# Patient Record
Sex: FEMALE | Race: WHITE
[De-identification: names, ages, dates, MRNs, and addresses within clinical notes are randomized per-mention and may not be internally consistent; named-entity substitution may affect disease eponyms.]

---

## 2018-04-20 ENCOUNTER — HOSPITAL ENCOUNTER (EMERGENCY)
Dept: HOSPITAL 59 - ER | Age: 33
Discharge: HOME | End: 2018-04-20
Payer: COMMERCIAL

## 2018-04-20 DIAGNOSIS — R11.2: ICD-10-CM

## 2018-04-20 DIAGNOSIS — R10.30: Primary | ICD-10-CM

## 2018-04-20 DIAGNOSIS — M54.5: ICD-10-CM

## 2018-04-20 LAB
ANION GAP SERPL CALC-SCNC: 15 MMOL/L (ref 7–16)
APPEARANCE UR: CLEAR
BASOPHILS NFR BLD: 0.8 % (ref 0–6)
BILIRUB UR-MCNC: NEGATIVE MG/DL
BUN SERPL-MCNC: 15 MG/DL (ref 6–20)
CO2 SERPL-SCNC: 23 MMOL/L (ref 22–29)
COLOR UR: YELLOW
CREAT SERPL-MCNC: 0.8 MG/DL (ref 0.5–0.9)
EOSINOPHIL NFR BLD: 1.2 % (ref 0–6)
EPI CELLS #/AREA URNS HPF: (no result) /[HPF]
ERYTHROCYTE [DISTWIDTH] IN BLOOD BY AUTOMATED COUNT: 13.1 % (ref 11.5–14.5)
EST GLOMERULAR FILTRATION RATE: > 60 ML/MIN
GLUCOSE SERPL-MCNC: 139 MG/DL (ref 74–109)
GLUCOSE UR STRIP-MCNC: NEGATIVE MG/DL
GRANULOCYTES NFR BLD: 69.4 % (ref 47–80)
HCG,QUALITATIVE URINE: NEGATIVE
HCT VFR BLD CALC: 42.5 % (ref 35–47)
HGB BLD-MCNC: 14.1 GM/DL (ref 11.6–16)
KETONES UR QL STRIP: NEGATIVE
LYMPHOCYTES NFR BLD AUTO: 22.9 % (ref 16–45)
MCH RBC QN AUTO: 29.7 PG (ref 27–33)
MCHC RBC AUTO-ENTMCNC: 33.2 G/DL (ref 32–36)
MCV RBC AUTO: 89.5 FL (ref 81–97)
MONOCYTES NFR BLD: 5.7 % (ref 0–9)
NITRITE UR QL STRIP: NEGATIVE
PLATELET # BLD: 219 K/UL (ref 130–400)
PMV BLD AUTO: 10.9 FL (ref 7.4–10.4)
PROT UR QL STRIP: (no result)
RBC # BLD AUTO: 4.75 M/UL (ref 3.8–5.4)
RBC # UR STRIP: NEGATIVE /UL
RBC #/AREA URNS HPF: (no result) /[HPF]
URINE LEUKOCYTE ESTERASE: NEGATIVE
UROBILINOGEN UR STRIP-ACNC: 0.2 E.U./DL (ref 0.2–1)
WBC # BLD AUTO: 6.6 K/UL (ref 4.2–12.2)
WBC #/AREA URNS HPF: (no result) /[HPF]

## 2018-04-20 PROCEDURE — 85025 COMPLETE CBC W/AUTO DIFF WBC: CPT

## 2018-04-20 PROCEDURE — 96375 TX/PRO/DX INJ NEW DRUG ADDON: CPT

## 2018-04-20 PROCEDURE — 99284 EMERGENCY DEPT VISIT MOD MDM: CPT

## 2018-04-20 PROCEDURE — 80048 BASIC METABOLIC PNL TOTAL CA: CPT

## 2018-04-20 PROCEDURE — 81025 URINE PREGNANCY TEST: CPT

## 2018-04-20 PROCEDURE — 81001 URINALYSIS AUTO W/SCOPE: CPT

## 2018-04-20 PROCEDURE — 96374 THER/PROPH/DIAG INJ IV PUSH: CPT

## 2018-04-20 PROCEDURE — 74176 CT ABD & PELVIS W/O CONTRAST: CPT

## 2018-04-20 NOTE — EMERGENCY DEPARTMENT RECORD
History of Present Illness





- General


Chief complaint: Flank Pain


Stated complaint: BACK PAIN


Time Seen by Provider: 18 02:53


Source: Patient


Mode of Arrival: Ambulatory


Limitations: No limitations





- History of Present Illness


Initial comments: 





pt has l flank pain that gets better w movement.  [facility] 

[facility] and was told she had back spasm.  she states the pain has gotten 

worse. her father has had kidney stones.


MD Complaint: Other


Onset/Timin


-: Days(s)


Radiation: L flank, Suprapubic


Severity: Severe


Severity scale (1-10): 10


Quality: Cramping


Consistency: Constant, Getting worse


Improves with: None


Worsens with: None


Patient Pregnant: No


Associated Symptoms: Nausea/vomiting





- Related Data


Sexually active: No


 Previous Rx's











 Medication  Instructions  Recorded


 


Hydrocodone/Acetaminophen [Norco 1 each PO Q6HR #7 tablet 18





5-325 Tablet]  











 Allergies











Allergy/AdvReac Type Severity Reaction Status Date / Time


 


No Known Allergies Allergy   Unverified 18 10:43














Travel Screening





- Travel/Exposure Within Last 30 Days


Have you traveled within the last 30 days?: No





Review of Systems


Reviewed: No additional complaints except as noted below


Constitutional: Reports: As per HPI.  Denies: Chills, Fever, Malaise, Night 

sweats, Weakness, Weight change


Eyes: Reports: As per HPI.  Denies: Eye discharge, Eye pain, Photophobia, 

Vision change


ENT: Reports: As per HPI.  Denies: Congestion, Dental pain, Ear pain, Epistaxis

, Hearing loss, Throat pain


Respiratory: Reports: As per HPI.  Denies: Cough, Dyspnea, Hemoptysis, Stridor, 

Wheezes


Cardiovascular: Reports: As per HPI.  Denies: Arrhythmia, Chest pain, Dyspnea 

on exertion, Edema, Murmurs, Orthopnea, Palpitations, Paroxysmal nocturnal 

dyspnea, Rheumatic Fever, Syncope


Endocrine: Reports: As per HPI.  Denies: Fatigue, Heat or cold intolerance, 

Polydipsia, Polyuria


Gastrointestinal: Reports: As per HPI.  Denies: Abdominal pain, Constipation, 

Diarrhea, Hematemesis, Hematochezia, Melena, Nausea, Vomiting


Genitourinary: Reports: As per HPI.  Denies: Abnormal menses, Discharge, 

Dyspareunia, Dysuria, Frequency, Hematuria, Incontinence, Retention, Urgency


Musculoskeletal: Reports: As per HPI.  Denies: Arthralgia, Back pain, Gout, 

Joint swelling, Myalgia, Neck pain


Skin: Reports: As per HPI.  Denies: Bruising, Change in color, Change in hair/

nails, Lesions, Pruritus, Rash


Neurological: Reports: As per HPI.  Denies: Abnormal gait, Confusion, Headache, 

Numbness, Paresthesias, Seizure, Tingling, Tremors, Vertigo, Weakness


Psychiatric: Reports: As per HPI.  Denies: Anxiety, Auditory hallucinations, 

Depression, Homicidal thoughts, Suicidal thoughts, Visual hallucinations


Hematological/Lymphatic: Reports: As per HPI.  Denies: Anemia, Blood Clots, 

Easy bleeding, Easy bruising, Swollen glands





Past Medical History





- SOCIAL HISTORY


Smoking Status: Never smoker


Alcohol Use: None


Drug Use: None





- RESPIRATORY


Hx Respiratory Disorders: No





- CARDIOVASCULAR


Hx Cardio Disorders: No





- NEURO


Hx Neuro Disorders: No





- GI


Hx GI Disorders: No





- 


Hx Genitourinary Disorders: No





- ENDOCRINE


Hx Endocrine Disorders: No





- MUSCULOSKELETAL


Hx Musculoskeletal Disorders: No





- PSYCH


Hx Psych Problems: No





- HEMATOLOGY/ONCOLOGY


Hx Hematology/Oncology Disorders: No





Family Medical History


Any Significant Family History?: No





Physical Exam





- General


General Appearance: Alert, Oriented x3, Cooperative, Moderate distress





- Head


Head exam: Normal inspection





- Eye


Eye exam: Normal appearance, PERRL, EOMI


Pupils: Normal accommodation





- ENT


ENT exam: Normal exam, Mucous membranes moist, Normal external ear exam, Normal 

orophraynx


Ear exam: Normal external inspection.  negative: External canal tenderness


Nasal Exam: Normal inspection.  negative: Discharge, Sinus tenderness


Mouth exam: Normal external inspection, Tongue normal


Teeth exam: Normal inspection.  negative: Dental caries


Throat exam: Normal inspection.  negative: Tonsillar erythema, Tonsillar exudate





- Neck


Neck exam: Normal inspection, Full ROM.  negative: Tenderness





- Respiratory


Respiratory exam: Normal lung sounds bilaterally.  negative: Respiratory 

distress





- Cardiovascular


Cardiovascular Exam: Normal rhythm, Normal heart sounds, Tachycardia





- GI/Abdominal


GI/Abdominal exam: Soft, Normal bowel sounds.  negative: Tenderness





- Rectal


Rectal exam: Deferred





- 


 exam: Deferred





- Extremities


Extremities exam: Normal inspection, Full ROM, Normal capillary refill.  

negative: Tenderness





- Back


Back exam: Reports: Normal inspection, Full ROM.  Denies: Muscle spasm, Rash 

noted, Tenderness





- Neurological


Neurological exam: Alert, CN II-XII intact, Normal gait, Oriented X3





- Psychiatric


Psychiatric exam: Normal affect, Normal mood





- Skin


Skin exam: Dry, Intact, Normal color, Warm





Course





 Vital Signs











  18





  02:53


 


Temperature 97.7 F


 


Pulse Rate [ 116 H





Pulse Ox Probe] 


 


Respiratory 24





Rate 


 


Blood Pressure 129/87





[Right Arm] 


 


Pulse Ox 100














- Reevaluation(s)


Reevaluation #1: 





18 04:47


pt feels much better





Medical Decision Making





- Lab Data


Result diagrams: 


 18 03:10





 18 03:10





 Lab Results











  18 Range/Units





  03:00 03:00 03:10 


 


WBC    6.6  (4.2-12.2)  K/uL


 


RBC    4.75  (3.80-5.40)  M/uL


 


Hgb    14.1  (11.6-16.0)  gm/dl


 


Hct    42.5  (35.0-47.0)  %


 


MCV    89.5  (81-97)  fl


 


MCH    29.7  (27-33)  pg


 


MCHC    33.2  (32-36)  g/dl


 


RDW    13.1  (11.5-14.5)  %


 


Plt Count    219  (130-400)  K/uL


 


MPV    10.9 H  (7.4-10.4)  fl


 


Gran %    69.4  (47-80)  %


 


Lymphocytes %    22.9  (16-45)  %


 


Monocytes %    5.7  (0-9)  %


 


Eosinophils %    1.2  (0-6)  %


 


Basophils %    0.8  (0-6)  %


 


Serum HCG, Qual   Cancelled   


 


Urine Color  Yellow    


 


Urine Appearance  Clear    


 


Urine pH  7.5    (5.0-8.0)  


 


Ur Specific Gravity  1.020    (1.002-1.030)  


 


Urine Protein  Trace H    (NEGATIVE)  


 


Urine Glucose (UA)  Negative    (NEGATIVE)  


 


Urine Ketones  Negative    (NEGATIVE)  


 


Urine Blood  Negative    (NEGATIVE)  


 


Urine Nitrite  Negative    (NEGATIVE)  


 


Urine Bilirubin  Negative    (NEGATIVE)  


 


Urine Urobilinogen  0.2    (0.20 - 1.00)  E.U./dL


 


Ur Leukocyte Esterase  Negative    (NEGATIVE)  


 


Urine RBC  None seen    (NONE SEEN)  


 


Urine WBC  0 - 2    (0-2/hpf)  


 


Ur Epithelial Cells  0 - 2    (FEW)  


 


Urine HCG, Qual  Negative    (NEGATIVE)  














Disposition


Disposition: Discharge


Clinical Impression: 


 Flank pain





Disposition: Home, Self-Care


Condition: (1) Good


Instructions:  Flank Pain (ED)


Additional Instructions: 


follow up with family doctor.  return sooner if worse. push fluids.  continue 

with  motrin and muscle relaxer


Prescriptions: 


Hydrocodone/Acetaminophen [Norco 5-325 Tablet] 1 each PO Q6HR #7 tablet


Forms:  Patient Portal Access





Quality





- Quality Measures


Quality Measures: N/A





- Blood Pressure Screening


Does Patient Have Any of the Following: No


Blood Pressure Classification: Pre-Hypertensive BP Reading


Systolic Measurement: 129


Diastolic Measurement: 87


Screening for High Blood Pressure: < Pre-Hypertensive BP, F/U Documented > [

]


Pre-Hypertensive Follow-up Interventions: Follow-up with rescreen every year.